# Patient Record
Sex: MALE | Race: BLACK OR AFRICAN AMERICAN
[De-identification: names, ages, dates, MRNs, and addresses within clinical notes are randomized per-mention and may not be internally consistent; named-entity substitution may affect disease eponyms.]

---

## 2021-06-14 ENCOUNTER — HOSPITAL ENCOUNTER (EMERGENCY)
Dept: HOSPITAL 63 - ER | Age: 31
Discharge: HOME | End: 2021-06-14
Payer: COMMERCIAL

## 2021-06-14 VITALS — SYSTOLIC BLOOD PRESSURE: 135 MMHG | DIASTOLIC BLOOD PRESSURE: 82 MMHG

## 2021-06-14 VITALS — BODY MASS INDEX: 19.71 KG/M2 | HEIGHT: 68 IN | WEIGHT: 130.07 LBS

## 2021-06-14 DIAGNOSIS — M79.672: Primary | ICD-10-CM

## 2021-06-14 PROCEDURE — 73610 X-RAY EXAM OF ANKLE: CPT

## 2021-06-14 PROCEDURE — 99284 EMERGENCY DEPT VISIT MOD MDM: CPT

## 2021-06-14 PROCEDURE — 73630 X-RAY EXAM OF FOOT: CPT

## 2021-06-14 NOTE — PHYS DOC
Past History


Past Medical History:  No Pertinent History


Past Surgical History:  No Surgical History


Alcohol Use:  None





Adult General


Chief Complaint


Chief Complaint:  FOOT INJURY PAIN





HPI


HPI


Patient is a 31-year-old male who presents with left foot pain, 6 out of 10, 

dull and achy in nature.  States he was exercising and kicking the heavy bag 

when he injured it.  States he is able to walk but it causes discomfort.  States

he did not take any medications for this.  Denies any other injuries.





Review of Systems


Review of Systems


Review of systems otherwise unremarkable except noted in HPI





Current Medications


Current Medications





Current Medications








 Medications


  (Trade)  Dose


 Ordered  Sig/Jg  Start Time


 Stop Time Status Last Admin


Dose Admin


 


 Ibuprofen


  (Motrin)  600 mg  1X  ONCE  6/14/21 19:30


 6/14/21 19:40 DC  














Allergies


Allergies





Allergies








Coded Allergies Type Severity Reaction Last Updated Verified


 


  No Known Drug Allergies    6/14/21 No











Physical Exam


Physical Exam





Constitutional: Well developed, well nourished, no acute distress, non-toxic 

appearance. []


Cardiovascular:Heart rate regular rhythm, no murmur []


Lungs & Thorax:  Bilateral breath sounds clear to auscultation []


Skin: Warm, dry, no erythema, no rash. [] 


Back: No tenderness, no CVA tenderness. [] 


Extremities: Neurovascular exam intact.  Tenderness on anterior portion of the 

foot with mild swelling but no obvious deformities or bruising.  Range of motion

 intact.  Patient able to walk.


Neurologic: Alert and oriented X 3, normal motor function, normal sensory f

unction, no focal deficits noted. []


Psychologic: Affect normal, judgement normal, mood normal. []





Current Patient Data


Vital Signs





                                   Vital Signs








  Date Time  Temp Pulse Resp B/P (MAP) Pulse Ox O2 Delivery O2 Flow Rate FiO2


 


6/14/21 19:20 99.3 103 16 135/82 (99) 100   











EKG


EKG


[]





Radiology/Procedures


Radiology/Procedures


[]Exam: Left ankle 3 views. Left foot 3 views





INDICATION: Kicked object, left ankle pain





TECHNIQUE: Frontal, lateral and oblique views of the left foot and ankle





Comparisons: None





FINDINGS:


Ankle:


Bone mineralization is normal. No acute or healed fractures. Soft tissues are 

unremarkable. Joint spaces are well-maintained.





Foot:


Bone mineralization is normal. No acute or healed fractures. Soft tissues are 

unremarkable. Joint spaces are well-maintained.





IMPRESSION:


No acute osseous abnormality of the left ankle or left foot.





Electronically signed by: Elena Hooks MD (6/14/2021 7:57 PM) [id number]











Heart Score


C/O Chest Pain:  No


Risk Factors:


Risk Factors:  DM, Current or recent (<one month) smoker, HTN, HLP, family 

history of CAD, obesity.


Risk Scores:


Risk Factors:  DM, Current or recent (<one month) smoker, HTN, HLP, family 

history of CAD, obesity.





Course & Med Decision Making


Course & Med Decision Making


Patient is a 31-year-old male who presents with left foot pain after kicking a 

heavy bag


Vital signs not concerning.  Physical exam noted above.  Given ice.  Given oral 

pain medication.  Ace wrap.


Ridging noted above with no acute osseous abnormalities.  Discussed pain 

management at home with patient.


Advised to follow-up with primary care physician as needed.  Gave return 

precautions to the ED.


Patient grateful, verbalized understanding and agreed with plan of discharge.





[]





Dragon Disclaimer


Dragon Disclaimer


This electronic medical record was generated, in whole or in part, using a voice

 recognition dictation system.





Departure


Departure:


Impression:  


   Primary Impression:  


   Foot pain


Disposition:  01 HOME / SELF CARE / HOMELESS


Condition:  GOOD


Referrals:  


PCP,NO (PCP)








MARIELLE ALCALA MD


Patient Instructions:  RICE - Routine Care for Injuries





Additional Instructions:  


Thank you for coming into the emergency department today and allowing us to take

 care of you.  You are given oral pain medications and ice in the emergency 

department.  Here x-rays did not show any broken bones but that does not mean 

that she did not sprain your ankle and/or foot as discussed.  Please continue to

 use Tylenol, ibuprofen and ice as discussed.  Please follow-up with your 

primary care physician when you can to discuss your ED visit.  Please come back 

to the ED with new or concerning symptoms as discussed.











NENITA WORTHY MD               Jun 14, 2021 20:10

## 2021-06-14 NOTE — RAD
Exam: Left ankle 3 views. Left foot 3 views



INDICATION: Kicked object, left ankle pain



TECHNIQUE: Frontal, lateral and oblique views of the left foot and ankle



Comparisons: None



FINDINGS:

Ankle:

Bone mineralization is normal. No acute or healed fractures. Soft tissues are unremarkable. Joint spa
farzaneh are well-maintained.



Foot:

Bone mineralization is normal. No acute or healed fractures. Soft tissues are unremarkable. Joint spa
farzaneh are well-maintained.



IMPRESSION:

No acute osseous abnormality of the left ankle or left foot.



Electronically signed by: Elena Hooks MD (6/14/2021 7:57 PM) [facility]